# Patient Record
Sex: FEMALE
[De-identification: names, ages, dates, MRNs, and addresses within clinical notes are randomized per-mention and may not be internally consistent; named-entity substitution may affect disease eponyms.]

---

## 2018-12-11 ENCOUNTER — HOSPITAL ENCOUNTER (EMERGENCY)
Dept: HOSPITAL 97 - ER | Age: 53
Discharge: HOME | End: 2018-12-11
Payer: SELF-PAY

## 2018-12-11 DIAGNOSIS — R07.9: Primary | ICD-10-CM

## 2018-12-11 DIAGNOSIS — I10: ICD-10-CM

## 2018-12-11 LAB
ALBUMIN SERPL BCP-MCNC: 3.9 G/DL (ref 3.4–5)
ALP SERPL-CCNC: 63 U/L (ref 45–117)
ALT SERPL W P-5'-P-CCNC: 19 U/L (ref 12–78)
AST SERPL W P-5'-P-CCNC: 13 U/L (ref 15–37)
BUN BLD-MCNC: 18 MG/DL (ref 7–18)
GLUCOSE SERPLBLD-MCNC: 95 MG/DL (ref 74–106)
HCT VFR BLD CALC: 43 % (ref 36–45)
INR BLD: 1.15
LYMPHOCYTES # SPEC AUTO: 2.3 K/UL (ref 0.7–4.9)
MAGNESIUM SERPL-MCNC: 2.3 MG/DL (ref 1.8–2.4)
MCH RBC QN AUTO: 28.6 PG (ref 27–35)
MCV RBC: 85.4 FL (ref 80–100)
NT-PROBNP SERPL-MCNC: 55 PG/ML (ref ?–125)
PMV BLD: 10.3 FL (ref 7.6–11.3)
POTASSIUM SERPL-SCNC: 3.9 MMOL/L (ref 3.5–5.1)
RBC # BLD: 5.03 M/UL (ref 3.86–4.86)
TROPONIN (EMERG DEPT USE ONLY): < 0.02 NG/ML (ref 0–0.04)
UA COMPLETE W REFLEX CULTURE PNL UR: (no result)

## 2018-12-11 PROCEDURE — 83735 ASSAY OF MAGNESIUM: CPT

## 2018-12-11 PROCEDURE — 36415 COLL VENOUS BLD VENIPUNCTURE: CPT

## 2018-12-11 PROCEDURE — 71045 X-RAY EXAM CHEST 1 VIEW: CPT

## 2018-12-11 PROCEDURE — 83880 ASSAY OF NATRIURETIC PEPTIDE: CPT

## 2018-12-11 PROCEDURE — 85025 COMPLETE CBC W/AUTO DIFF WBC: CPT

## 2018-12-11 PROCEDURE — 80048 BASIC METABOLIC PNL TOTAL CA: CPT

## 2018-12-11 PROCEDURE — 85610 PROTHROMBIN TIME: CPT

## 2018-12-11 PROCEDURE — 99285 EMERGENCY DEPT VISIT HI MDM: CPT

## 2018-12-11 PROCEDURE — 84484 ASSAY OF TROPONIN QUANT: CPT

## 2018-12-11 PROCEDURE — 70450 CT HEAD/BRAIN W/O DYE: CPT

## 2018-12-11 PROCEDURE — 85379 FIBRIN DEGRADATION QUANT: CPT

## 2018-12-11 PROCEDURE — 81015 MICROSCOPIC EXAM OF URINE: CPT

## 2018-12-11 PROCEDURE — 80076 HEPATIC FUNCTION PANEL: CPT

## 2018-12-11 PROCEDURE — 93005 ELECTROCARDIOGRAM TRACING: CPT

## 2018-12-11 PROCEDURE — 81003 URINALYSIS AUTO W/O SCOPE: CPT

## 2018-12-11 NOTE — ER
Nurse's Notes                                                                                     

 [facility]                                                                

Name: Diann Maddox                                                                                 

Age: 53 yrs                                                                                       

Sex: Female                                                                                       

: 1965                                                                                   

MRN: [id number]                                                                                   

Arrival Date: 2018                                                                          

Time: 12:19                                                                                       

Account#: [id number]                                                                            

Bed 20                                                                                            

Private MD:                                                                                       

Diagnosis: Chest pain, unspecified                                                                

                                                                                                  

Presentation:                                                                                     

                                                                                             

12:19 Presenting complaint: Patient states: sitting at work when suddenly approximately 30    ss  

      minutes ago, began experiencing dizziness and slight L sided chest tightness and            

      intermittent palpitations. Transition of care: patient was not received from another        

      setting of care. Onset of symptoms was 2018. Risk Assessment: Do you want      

      to hurt yourself or someone else? Patient reports no desire to harm self or others.         

      Initial Sepsis Screen: Does the patient meet any 2 criteria? No. Patient's initial          

      sepsis screen is negative. Does the patient have a suspected source of infection? No.       

      Patient's initial sepsis screen is negative. Care prior to arrival: None.                   

12:19 Method Of Arrival: EMS: [facility]                                                      

12:19 Acuity: BILLY 3                                                                           ss  

                                                                                                  

Historical:                                                                                       

- Allergies:                                                                                      

12:20 NKDA;                                                                                   ss  

- PMHx:                                                                                           

17:52 Hypertension; Glaucoma;                                                                 gs  

                                                                                                  

- Immunization history:: Adult Immunizations up to date.                                          

- Social history:: Smoking status: Patient/guardian denies using tobacco, the patient             

  reports quitting approximately 7 years ago.                                                     

- Ebola Screening: : Patient denies exposure to infectious person Patient denies travel           

  to an Ebola-affected area in the 21 days before illness onset.                                  

                                                                                                  

                                                                                                  

Screenin:00 Fall Risk None identified.                                                              ca1 

13:40 Abuse screen: Denies threats or abuse. Nutritional screening: No deficits noted.        ca1 

      Tuberculosis screening: No symptoms or risk factors identified.                             

                                                                                                  

Assessment:                                                                                       

13:00 General: Appears in no apparent distress. comfortable, Behavior is calm, cooperative.   ca1 

      Pain: Pain: Complains of pain in anterior aspect of left upper chest Pain does not          

      radiate. Pain at worst was 8 out of 10 on a pain scale. Quality of pain is described as     

      tightness Pain began suddenly, 2 hours ago. Is intermittent.                                

13:00 Neuro: Level of Consciousness is awake, alert, obeys commands, Oriented to person,      ca1 

      place, time, situation. Neuro: Reports dizziness, since 2-3 hours ago. Cardiovascular:.     

      Cardiovascular: Reports Heart tones S1 S2 present Capillary refill < 3 seconds Rhythm       

      is regular. Respiratory: Airway is patent Trachea midline Respiratory effort is even,       

      unlabored, Respiratory pattern is regular, symmetrical, Breath sounds are clear             

      bilaterally. GI: No signs and/or symptoms were reported involving the gastrointestinal      

      system. : No signs and/or symptoms were reported regarding the genitourinary system.      

      EENT: No signs and/or symptoms were reported regarding the EENT system. Derm: Skin is       

      intact, is healthy with good turgor, Skin is pink, warm \T\ dry. Musculoskeletal: No        

      signs and/or symptoms reported regarding the musculoskeletal system.                        

14:00 Reassessment: Patient appears in no apparent distress at this time. No changes from     hb  

      previously documented assessment. Patient and/or family updated on plan of care and         

      expected duration. Pain level reassessed. Patient is alert, oriented x 3, equal             

      unlabored respirations, skin warm/dry/pink.                                                 

14:50 Reassessment: Patient appears in no apparent distress at this time. No changes from     hb  

      previously documented assessment. Patient and/or family updated on plan of care and         

      expected duration. Pain level reassessed. Patient is alert, oriented x 3, equal             

      unlabored respirations, skin warm/dry/pink.                                                 

15:10 Reassessment:. Cardiovascular: Chest pain is described as Pain is 5 out of 10 on a pain ca1 

      scale. quality is tightness. is located in left began suddenly, episodes are continuous     

      Dr. Calloway notified. Repeat EKG ordered.                                                     

16:06 Reassessment: Patient appears in no apparent distress at this time. Patient and/or      hb  

      family updated on plan of care and expected duration. Pain level reassessed. Patient is     

      alert, oriented x 3, equal unlabored respirations, skin warm/dry/pink. Pt reports mild      

      chest discomfort 3/10. Dr. Calloway aware. No new orders at this time.                         

18:11 Reassessment: No changes from previously documented assessment. Patient and/or family   mg2 

      updated on plan of care and expected duration. Pain level reassessed. Patient is alert,     

      oriented x 3, equal unlabored respirations, skin warm/dry/pink.                             

                                                                                                  

Vital Signs:                                                                                      

12:20  / 88; Pulse 88; Resp 16; Pulse Ox 98% on R/A; Weight 72.57 kg; Height 5 ft. 4    ss  

      in. (162.56 cm); Pain 0/10;                                                                 

13:40  / 80; Pulse 75; Resp 19; Pulse Ox 98% ; Pain 0/10;                               ca1 

14:50  / 83; Pulse 77; Resp 16; Pulse Ox 100% on R/A; Pain 0/10;                        hb  

16:00  / 89; Pulse 94; Resp 18; Pulse Ox 98% on R/A;                                    hb  

18:11  / 84; Pulse 90; Resp 18; Pulse Ox 100% on R/A; Pain 0/10;                        mg2 

12:20 Body Mass Index 27.46 (72.57 kg, 162.56 cm)                                               

                                                                                                  

ED Course:                                                                                        

12:19 Patient arrived in ED.                                                                  ss  

12:19 David Calloway MD is Attending Physician.                                              gs  

12:20 Triage completed.                                                                       ss  

12:20 Arm band placed on right wrist.                                                         ss  

12:38 EKG done, by EKG tech. reviewed by David Calloway MD.                                    at1 

12:49 Patient moved to CT via wheelchair.                                                     ca1 

12:50 CT Head Brain wo Cont In Process Unspecified.                                           EDMS

12:50 Patient has correct armband on for positive identification. Placed in gown. Bed in low  ca1 

      position. Call light in reach. Side rails up X 1.                                           

12:54 X-ray completed. Portable x-ray completed in exam room. Patient tolerated procedure     mh1 

      well.                                                                                       

12:56 XRAY Chest (1 view) In Process Unspecified.                                             EDMS

13:00 Cardiac monitor on. Pulse ox on. NIBP on.                                               ca1 

13:00 Inserted saline lock: 20 gauge in left antecubital area, using aseptic technique. Blood ca1 

      collected. Patient maintains SpO2 saturation greater than 95% on room air.                  

13:47 Nellie Damian, RN is Primary Nurse.                                                   hb  

15:15 REPEAT EKG WAS DONE.                                                                    sm3 

16:50 Repeat lab(s) drawn. by me, sent to lab.                                                dh3 

18:11 No provider procedures requiring assistance completed. IV discontinued, intact,         mg2 

      bleeding controlled, No redness/swelling at site. Pressure dressing applied.                

                                                                                                  

Administered Medications:                                                                         

No medications were administered                                                                  

                                                                                                  

                                                                                                  

Outcome:                                                                                          

17:54 Discharge ordered by MD.                                                                gs  

18:16 Discharged to home ambulatory.                                                          hb  

18:16 Condition: stable                                                                           

18:16 Discharge instructions given to patient, Instructed on discharge instructions, follow       

      up and referral plans. medication usage, Demonstrated understanding of instructions,        

      follow-up care, medications.                                                                

18:16 Patient left the ED.                                                                    hb  

                                                                                                  

Signatures:                                                                                       

Dispatcher [employer]                           EDMS                                                 

Bing Nieves                               1                                                  

Gena Barahona, RN                      RN                                                      

Kim Lima, EKG Tech              EKG Tat1                                                  

Kitty Cloud, RN                     RN   rb1                                                  

Nellie Damian RN                     RN                                                      

Torie Santacruz                              3                                                  

David Calloway MD MD                                                      

Ton Jaffe RN                    RN   mg2                                                  

Manasa Shabazz                              3                                                  

Valeria Alejandro RN                        RN   ca1                                                  

                                                                                                  

Corrections: (The following items were deleted from the chart)                                    

 13:10 Inserted saline lock: 20 gauge in left antecubital area, using aseptic technique. rb1 

      Blood collected. rb1                                                                        

 13:10 Initial lab(s) drawn, by me, sent to lab. rb1                                     rb1 

13: 12:50 Cardiac monitor on. Pulse ox on. NIBP on. ca1                                     ca1 

:40 13:30 General: Appears in no apparent distress. comfortable, Behavior is calm,          ca1 

      cooperative, ca1                                                                            

13:40 13:30 Pain: ca1                                                                         ca1 

13:42 13:40 Fall Risk None identified. ca1                                                    ca1 

                                                                                                  

**************************************************************************************************

## 2018-12-11 NOTE — EDPHYS
Physician Documentation                                                                           

 [facility]                                                                

Name: Diann Maddox                                                                                 

Age: 53 yrs                                                                                       

Sex: Female                                                                                       

: 1965                                                                                   

MRN: [id number]                                                                                   

Arrival Date: 2018                                                                          

Time: 12:19                                                                                       

Account#: [id number]                                                                            

Bed 20                                                                                            

Private MD:                                                                                       

ED Physician David Calloway                                                                       

HPI:                                                                                              

                                                                                             

18:00 This 53 yrs old  Female presents to ER via EMS with complaints of Chest Pain.   gs  

18:00 The patient or guardian reports chest pain that is located primarily in the anterior    gs  

      chest wall. Onset: today. The pain does not radiate. Associated signs and symptoms:         

      Pertinent positives: dizziness. The chest pain is described as dull. Duration: The          

      patient or guardian reports multiple episodes, that are intermittent, that wax and          

      wane, with no pattern, the episodes last approximately 5 second(s). Modifying factors:      

      The symptoms are alleviated by nothing. the symptoms are aggravated by nothing.             

      Severity of pain: At its worst the pain was moderate in the emergency department the        

      pain has resolved.                                                                          

                                                                                                  

Historical:                                                                                       

- Allergies:                                                                                      

12:20 NKDA;                                                                                   ss  

- PMHx:                                                                                           

17:52 Hypertension; Glaucoma;                                                                 gs  

                                                                                                  

- Immunization history:: Adult Immunizations up to date.                                          

- Social history:: Smoking status: Patient/guardian denies using tobacco, the patient             

  reports quitting approximately 7 years ago.                                                     

- Ebola Screening: : Patient denies exposure to infectious person Patient denies travel           

  to an Ebola-affected area in the 21 days before illness onset.                                  

                                                                                                  

                                                                                                  

ROS:                                                                                              

18:00 All other systems are negative.                                                         gs  

                                                                                                  

Exam:                                                                                             

18:00 Head/Face:  Normocephalic, atraumatic. Eyes:  Pupils equal round and reactive to light, gs  

      extra-ocular motions intact.  Lids and lashes normal.  Conjunctiva and sclera are           

      non-icteric and not injected.  Cornea within normal limits.  Periorbital areas with no      

      swelling, redness, or edema. ENT:  Nares patent. No nasal discharge, no septal              

      abnormalities noted.  Tympanic membranes are normal and external auditory canals are        

      clear.  Oropharynx with no redness, swelling, or masses, exudates, or evidence of           

      obstruction, uvula midline.  Mucous membranes moist. Neck:  Trachea midline, no             

      thyromegaly or masses palpated, and no cervical lymphadenopathy.  Supple, full range of     

      motion without nuchal rigidity, or vertebral point tenderness.  No Meningismus.             

      Chest/axilla:  Normal chest wall appearance and motion.  Nontender with no deformity.       

      No lesions are appreciated.                                                                 

18:00 Constitutional: The patient appears alert, awake.                                           

18:00 Cardiovascular: Rate: normal, Rhythm: regular, Pulses: no pulse deficits are                

      appreciated, Heart sounds: normal.                                                          

18:00 ECG was reviewed by the Attending Physician.                                                

18:03 Respiratory:  Lungs have equal breath sounds bilaterally, clear to auscultation and     gs  

      percussion.  No rales, rhonchi or wheezes noted.  No increased work of breathing, no        

      retractions or nasal flaring. Abdomen/GI:  Soft, non-tender, with normal bowel sounds.      

      No distension or tympany.  No guarding or rebound.  No evidence of tenderness               

      throughout. Back:  No spinal tenderness.  No costovertebral tenderness.  Full range of      

      motion. Skin:  Warm, dry with normal turgor.  Normal color with no rashes, no lesions,      

      and no evidence of cellulitis. MS/ Extremity:  Pulses equal, no cyanosis.                   

      Neurovascular intact.  Full, normal range of motion. Neuro:  Awake and alert, GCS 15,       

      oriented to person, place, time, and situation.  Cranial nerves II-XII grossly intact.      

      Motor strength 5/5 in all extremities.  Sensory grossly intact.  Cerebellar exam            

      normal.  Normal gait.                                                                       

18:03 ECG was reviewed by the Attending Physician.                                                

                                                                                                  

Vital Signs:                                                                                      

12:20  / 88; Pulse 88; Resp 16; Pulse Ox 98% on R/A; Weight 72.57 kg; Height 5 ft. 4    ss  

      in. (162.56 cm); Pain 0/10;                                                                 

13:40  / 80; Pulse 75; Resp 19; Pulse Ox 98% ; Pain 0/10;                               ca1 

14:50  / 83; Pulse 77; Resp 16; Pulse Ox 100% on R/A; Pain 0/10;                        hb  

16:00  / 89; Pulse 94; Resp 18; Pulse Ox 98% on R/A;                                    hb  

18:11  / 84; Pulse 90; Resp 18; Pulse Ox 100% on R/A; Pain 0/10;                        mg2 

12:20 Body Mass Index 27.46 (72.57 kg, 162.56 cm)                                             ss  

                                                                                                  

MDM:                                                                                              

12:34 Patient medically screened.                                                             gs  

18:03 Differential diagnosis: coronary artery disease chest wall pain, pneumonia, pulmonary   gs  

      embolus. Data reviewed: vital signs, nurses notes. Counseling: I had a detailed             

      discussion with the patient and/or guardian regarding: the historical points, exam          

      findings, and any diagnostic results supporting the discharge/admit diagnosis, lab          

      results, radiology results, the need for outpatient follow up.                              

                                                                                                  

                                                                                             

12:34 Order name: Basic Metabolic Panel                                                         

                                                                                             

12:34 Order name: CBC with Diff                                                                 

                                                                                             

12:34 Order name: LFT's; Complete Time: 15:11                                                   

                                                                                             

12:34 Order name: Magnesium; Complete Time: 15:11                                               

                                                                                             

12:34 Order name: NT PRO-BNP; Complete Time: 15:11                                              

                                                                                             

12:34 Order name: PT-INR; Complete Time: 15:11                                                  

                                                                                             

12:34 Order name: Troponin (emerg Dept Use Only); Complete Time: 15:11                          

                                                                                             

12:34 Order name: XRAY Chest (1 view); Complete Time: 13:25                                     

                                                                                             

12:34 Order name: Urine Microscopic Only; Complete Time: 15:11                                  

                                                                                             

12:34 Order name: D-Dimer; Complete Time: 15:11                                                 

                                                                                             

12:35 Order name: Basic Metabolic Panel; Complete Time: 15:11                                 EDMS

                                                                                             

17:09 Interpretation: .                                                                   

                                                                                             

12:35 Order name: CBC with Automated Diff; Complete Time: 15:11                               EDMS

                                                                                             

13:25 Order name: Urine Dipstick--Ancillary (enter results); Complete Time: 15:11               

                                                                                             

15:44 Order name: Troponin (emerg Dept Use Only); Complete Time: 17:45                          

                                                                                             

12:34 Order name: EKG; Complete Time: 12:35                                                     

                                                                                             

12:34 Order name: Cardiac monitoring; Complete Time: 13:47                                      

                                                                                             

12:34 Order name: EKG - Nurse/Tech; Complete Time: 13:47                                        

                                                                                             

12:34 Order name: IV Saline Lock; Complete Time: 13:47                                          

                                                                                             

12:34 Order name: Labs collected and sent; Complete Time: 13:47                                 

                                                                                             

12:34 Order name: O2 Per Protocol; Complete Time: 13:47                                         

                                                                                             

12:34 Order name: O2 Sat Monitoring; Complete Time: 13:47                                       

                                                                                             

12:34 Order name: Urine Dipstick-Ancillary (obtain specimen); Complete Time: 13:47            gs  

                                                                                             

12:34 Order name: CT Head Brain wo Cont; Complete Time: 13:25                                 gs  

                                                                                             

15:20 Order name: EKG; Complete Time: 15:21                                                   ca1 

                                                                                             

15:20 Order name: EKG - Nurse/Tech; Complete Time: 15:32                                      ca1 

                                                                                                  

EC:00 Rate is 84 beats/min. Rhythm is regular. RI interval is normal. QRS interval is normal. gs  

      T waves are Normal. No ST changes noted. Clinical impression: Normal ECG. Interpreted       

      by me.                                                                                      

18:03 Rate is 66 beats/min. Rhythm is regular. RI interval is normal. QRS interval is normal. gs  

      QT interval is normal. T waves are Normal. No ST changes noted. Clinical impression:        

      Normal ECG. Interpreted by me.                                                              

                                                                                                  

Administered Medications:                                                                         

No medications were administered                                                                  

                                                                                                  

                                                                                                  

Disposition:                                                                                      

18 17:54 Discharged to Home. Impression: Chest pain, unspecified.                           

- Condition is Stable.                                                                            

- Discharge Instructions: Nonspecific Chest Pain.                                                 

                                                                                                  

- Medication Reconciliation Form, Thank You Letter, Antibiotic Education, Prescription            

  Opioid Use, Work release form form.                                                             

- Follow up: Emergency Department; When: 2 - 3 days; Reason: Re-evaluation by your                

  physician.                                                                                      

                                                                                                  

                                                                                                  

                                                                                                  

Signatures:                                                                                       

Dispatcher [employer]                           EDMS                                                 

Gena Barahona RN RN                                                      

Nellie Damian RN                     RN                                                      

David Calloway MD MD                                                      

Valeria Alejandro RN                        RN   ca1                                                  

                                                                                                  

Corrections: (The following items were deleted from the chart)                                    

18:16 17:54 2018 17:54 Discharged to Home. Impression: Chest pain, unspecified.         hb  

      Condition is Stable. Forms are Medication Reconciliation Form, Thank You Letter,            

      Antibiotic Education, Prescription Opioid Use. Follow up: Emergency Department; When: 2     

      - 3 days; Reason: Re-evaluation by your physician.                                        

                                                                                                  

**************************************************************************************************

## 2018-12-11 NOTE — EKG
Test Date:    2018-12-11               Test Time:    12:25:17

Technician:   NAKUL                                     

                                                     

MEASUREMENT RESULTS:                                       

Intervals:                                           

Rate:         84                                     

NM:           160                                    

QRSD:         82                                     

QT:           358                                    

QTc:          423                                    

Axis:                                                

P:            47                                     

NM:           160                                    

QRS:          55                                     

T:            62                                     

                                                     

INTERPRETIVE STATEMENTS:                                       

                                                     

Normal sinus rhythm

Normal ECG

No previous ECG available for comparison



Electronically Signed On 12-11-18 17:46:03 CST by All Luna

## 2018-12-11 NOTE — EKG
Test Date:    2018-12-11               Test Time:    15:10:14

Technician:   VASQUEZ                                     

                                                     

MEASUREMENT RESULTS:                                       

Intervals:                                           

Rate:         66                                     

ND:           156                                    

QRSD:         80                                     

QT:           412                                    

QTc:          431                                    

Axis:                                                

P:            51                                     

ND:           156                                    

QRS:          57                                     

T:            63                                     

                                                     

INTERPRETIVE STATEMENTS:                                       

                                                     

Normal sinus rhythm

Normal ECG

Compared to ECG 12/11/2018 12:25:17

No significant changes



Electronically Signed On 12-11-18 17:44:18 CST by All Luna

## 2018-12-11 NOTE — RAD REPORT
EXAM DESCRIPTION:  Femi Single View12/11/2018 12:55 pm

 

CLINICAL HISTORY:  Chest pain

 

COMPARISON:  2016

 

FINDINGS:   The lungs appear clear of acute infiltrate. The heart is normal size

 

IMPRESSION:   No acute abnormalities displayed

## 2018-12-11 NOTE — RAD REPORT
EXAM DESCRIPTION:  CT - Head Brain Wo Cont - 12/11/2018 12:50 pm

 

CLINICAL HISTORY:  Dizziness, syncope

 

COMPARISON:  November 2016

 

TECHNIQUE:  Axial 5 mm thick images of the head were obtained without IV contrast.

 

All CT scans are performed using dose optimization technique as appropriate and may include automated
 exposure control or mA/KV adjustment according to patient size.

 

FINDINGS:  No intracranial hemorrhage, mass, edema or shift of mid-line structures. No acute infarcti
on changes seen. No abnormal extra-axial fluid collections. Ventricles are normal.

 

Mastoid air cells and middle ears are clear. Paranasal sinuses are clear.

 

No acute bony findings.

 

 

IMPRESSION:  Negative non-contrast CT head examination.  No significant change from the 2016 comparis
on.

## 2025-02-27 ENCOUNTER — HOSPITAL ENCOUNTER (EMERGENCY)
Dept: HOSPITAL 97 - ER | Age: 60
Discharge: HOME | End: 2025-02-27
Payer: COMMERCIAL

## 2025-02-27 VITALS — DIASTOLIC BLOOD PRESSURE: 57 MMHG | OXYGEN SATURATION: 99 % | SYSTOLIC BLOOD PRESSURE: 140 MMHG

## 2025-02-27 VITALS — TEMPERATURE: 97.6 F

## 2025-02-27 DIAGNOSIS — I10: ICD-10-CM

## 2025-02-27 DIAGNOSIS — Z88.8: ICD-10-CM

## 2025-02-27 DIAGNOSIS — R07.9: Primary | ICD-10-CM

## 2025-02-27 DIAGNOSIS — K21.00: ICD-10-CM

## 2025-02-27 LAB
ALBUMIN SERPL BCP-MCNC: 3.4 G/DL (ref 3.4–5)
ALBUMIN/GLOB SERPL: 0.9 {RATIO} (ref 1.1–1.8)
ALP SERPL-CCNC: 56 U/L (ref 45–117)
ALT SERPL W P-5'-P-CCNC: 19 U/L (ref 13–56)
ANION GAP SERPL CALC-SCNC: 11.4 MEQ/L (ref 5–15)
AST SERPL W P-5'-P-CCNC: 13 U/L (ref 15–37)
BILIRUB INDIRECT SERPL-MCNC: 0.2 MG/DL (ref 0.2–0.8)
BUN BLD-MCNC: 17 MG/DL (ref 7–18)
GLOBULIN SER CALC-MCNC: 3.7 G/DL (ref 2.3–3.5)
GLUCOSE SERPLBLD-MCNC: 133 MG/DL (ref 74–106)
HCT VFR BLD CALC: 37.2 % (ref 36–45)
HGB BLD-MCNC: 12.7 G/DL (ref 12–15)
INR BLD: 1.13
LYMPHOCYTES # SPEC AUTO: 2.4 K/UL (ref 0.7–4.9)
MAGNESIUM SERPL-MCNC: 2 MG/DL (ref 1.6–2.4)
MCH RBC QN AUTO: 28.3 PG (ref 27–35)
MCHC RBC AUTO-ENTMCNC: 34 G/DL (ref 32–36)
MCV RBC: 83.1 FL (ref 80–100)
NRBC # BLD: 0 10*3/UL (ref 0–0)
NRBC BLD AUTO-RTO: 0.1 % (ref 0–0)
NT-PROBNP SERPL-MCNC: 69 PG/ML (ref ?–125)
PMV BLD: 9.6 FL (ref 7.6–11.3)
POTASSIUM SERPL-SCNC: 3.4 MEQ/L (ref 3.5–5.1)
PROTHROMBIN TIME: 12.8 SECONDS (ref 10–13)
RBC # BLD: 4.48 M/UL (ref 3.86–4.86)
TROPONIN I SERPL HS-MCNC: < 3 PG/ML (ref ?–58.9)
WBC # BLD AUTO: 5.9 THOU/UL (ref 4.3–10.9)

## 2025-02-27 PROCEDURE — 83880 ASSAY OF NATRIURETIC PEPTIDE: CPT

## 2025-02-27 PROCEDURE — 71275 CT ANGIOGRAPHY CHEST: CPT

## 2025-02-27 PROCEDURE — 93005 ELECTROCARDIOGRAM TRACING: CPT

## 2025-02-27 PROCEDURE — 36415 COLL VENOUS BLD VENIPUNCTURE: CPT

## 2025-02-27 PROCEDURE — 80048 BASIC METABOLIC PNL TOTAL CA: CPT

## 2025-02-27 PROCEDURE — 71045 X-RAY EXAM CHEST 1 VIEW: CPT

## 2025-02-27 PROCEDURE — 99284 EMERGENCY DEPT VISIT MOD MDM: CPT

## 2025-02-27 PROCEDURE — 96374 THER/PROPH/DIAG INJ IV PUSH: CPT

## 2025-02-27 PROCEDURE — 85610 PROTHROMBIN TIME: CPT

## 2025-02-27 PROCEDURE — 84484 ASSAY OF TROPONIN QUANT: CPT

## 2025-02-27 PROCEDURE — 85025 COMPLETE CBC W/AUTO DIFF WBC: CPT

## 2025-02-27 PROCEDURE — 83735 ASSAY OF MAGNESIUM: CPT

## 2025-02-27 PROCEDURE — 80076 HEPATIC FUNCTION PANEL: CPT

## 2025-02-27 NOTE — EDPHYS
Physician Documentation                                                                           

 [facility]                                                                 

Name: Diann Maddox                                                                                 

Age: 59 yrs                                                                                       

Sex: Female                                                                                       

: 1965                                                                                   

MRN: [id number]                                                                                   

Arrival Date: 2025                                                                          

Time: 13:49                                                                                       

Account#: [id number]                                                                            

Bed 6                                                                                             

Private MD:                                                                                       

ED Physician Rodri Suazo                                                                         

HPI:                                                                                              

                                                                                             

14:21 This 59 yrs old  Female presents to ER via Ambulatory with complaints of Chest  sb4 

      Pain.                                                                                       

14:21 Patient reports 3 episodes of chest pain this past week. She did see cardiology 2 days  sb4 

      ago where she had a normal EKG and was scheduled to get a stress test and an                

      echocardiogram done. Reports the pain is in her left chest, below her breast, sometimes     

      feels sharp, and sometimes makes her feel like she cannot take a deep breath. Says that     

      she does feel some tingling in her left arm, but otherwise no radiation of pain. Denies     

      any dizziness, nausea, diaphoresis.                                                         

                                                                                                  

Historical:                                                                                       

- Allergies:                                                                                      

13:56 unknown steroid;                                                                        ap3 

- PMHx:                                                                                           

13:56 Glaucoma; Hypertension;                                                                 ap3 

                                                                                                  

- Immunization history:: Client reports receiving the 2nd dose of the Covid vaccine.              

- Infectious Disease History:: Denies.                                                            

- Social history:: Smoking status: Patient denies any tobacco usage or history of.                

                                                                                                  

                                                                                                  

ROS:                                                                                              

14:21 Constitutional: Negative for fever, chills, and weight loss,                            sb4 

14:21 Cardiovascular: Positive for chest pain,                                                    

14:21 All other systems are negative,                                                             

                                                                                                  

Exam:                                                                                             

16:58 Constitutional:  This is a well developed, well nourished patient who is awake, alert,  sb4 

      and in no acute distress. Head/Face:  Normocephalic, atraumatic. Eyes:  Extra-ocular        

      motions intact.  Periorbital areas with no swelling, redness, or edema. ENT:  Mucous        

      membranes moist. Cardiovascular:  Regular rate and rhythm with a normal S1 and S2.          

      Respiratory:  No increased work of breathing, no retractions or nasal flaring.              

      Abdomen/GI:  Soft, non-tender, no distension. Skin:  Warm, dry with normal turgor.          

      Normal color with no rashes, no lesions, and no evidence of cellulitis.                     

                                                                                                  

Vital Signs:                                                                                      

13:55  / 74; Pulse 63; Resp 18; Temp 97.6; Pulse Ox 100% ; Weight 74.84 kg; Height 5    ap3 

      ft. 4 in. ; Pain 8/10;                                                                      

15:36  / 57; Pulse 76; Resp 16; Pulse Ox 99% ;                                          bp  

13:55 Body Mass Index 28.32 (74.84 kg, 162.56 cm)                                             ap3 

13:55 Pain Scale: Adult                                                                       ap3 

                                                                                                  

MDM:                                                                                              

13:52 Medical Screening Exam initiated                                                        sb4 

15:06 Scoring Tools HEART Score: History: ECG: Age: Risk Factors: 1 or 2 risk factors (1),    sb4 

      Troponin: Total Score = 3.                                                                  

16:58 Data reviewed: vital signs, nurses notes, lab test result(s), EKG, radiologic studies,  sb4 

      and as a result, I will discharge patient. Consideration of Admission/Observation           

      Escalation of care including admission/observation considered. Care significantly           

      affected by the following chronic conditions: Hypertension. Counseling: I had a             

      detailed discussion with the patient and/or guardian regarding the historical points,       

      exam findings, and any diagnostic results supporting the discharge/admit diagnosis, the     

      presence of at least one elevated blood pressure reading (>120/80) during this              

      emergency department visit, lab results, radiology results, the need for outpatient         

      follow up, a cardiologist, a gastroenterologist, to return to the emergency department      

      if symptoms worsen or persist or if there are any questions or concerns that arise at       

      home.                                                                                       

                                                                                                  

                                                                                             

14:02 Order name: Basic Metabolic Panel; Complete Time: 15:06                                 ap3 

                                                                                             

14:02 Order name: CBC with Diff; Complete Time: 14:53                                         3 

                                                                                             

14:02 Order name: LFT's; Complete Time: 15:06                                                 3 

                                                                                             

14:02 Order name: Magnesium; Complete Time: 15:06                                             ap3 

                                                                                             

14:02 Order name: NT PRO-BNP; Complete Time: 15:06                                            ap3 

                                                                                             

14:02 Order name: PT-INR; Complete Time: 14:53                                                ap3 

                                                                                             

14:02 Order name: Troponin HS; Complete Time: 15:06                                           ap3 

                                                                                             

15:13 Order name: Troponin High Sensitivity: at 1640; Complete Time: 16:49                    sb4 

                                                                                             

14:02 Order name: XRAY Chest (1 view); Complete Time: 14:40                                   ap3 

                                                                                             

15:24 Order name: Chest For PE Angio CT; Complete Time: 16:00                                 sb4 

                                                                                             

14:02 Order name: Cardiac monitoring; Complete Time: 14:51                                    ap3 

                                                                                             

14:02 Order name: EKG - Nurse/Tech; Complete Time: 14:51                                      ap3 

                                                                                             

14:02 Order name: IV Saline Lock; Complete Time: 14:51                                        ap3 

                                                                                             

14:02 Order name: Labs collected and sent; Complete Time: 14:51                               ap3 

                                                                                             

14:02 Order name: O2 Per Protocol; Complete Time: 14:51                                       ap3 

                                                                                             

14:02 Order name: O2 Sat Monitoring; Complete Time: 14:51                                     ap3 

                                                                                                  

EC:15 Rate is 67 beats/min. Rhythm is regular, Normal Sinus Rhythm. TX interval is normal at  sb4 

      152 msec. QRS interval is normal at 78 msec. QT interval is normal at 410 msec. No Q        

      waves. T waves are Normal. Clinical impression: No evidence of ischemia. Interpreted by     

      me. Reviewed by me.                                                                         

                                                                                                  

Administered Medications:                                                                         

15:30 Drug: Aspirin  mg PO once Route: PO;                                              bp  

15:30 Drug: Nitroglycerin Sublingual 0.4 mg Sublingual once Route: Sublingual;                bp  

16:18 Follow up: Response: No adverse reaction                                                ld1 

16:18 Drug: Famotidine IVP 20 mg IVP once; dilute with 10 mL 0.9% NaCl; give over 2 minutes   ld1 

      Route: IVP; Site: right wrist;                                                              

                                                                                                  

                                                                                                  

Disposition:                                                                                      

18:12 Co-signature as Attending Physician, Rodri Suazo MD I reviewed the patient's care       rn  

      provided by the Advanced Practice Provider and agree with the diagnosis and treatment       

      plan.                                                                                       

                                                                                                  

Disposition Summary:                                                                              

25 16:57                                                                                    

Discharge Ordered                                                                                 

 Notes:       Location: Home                                                                        
  sb4

      Problem: new                                                                            sb4 

      Symptoms: have improved                                                                 sb4 

      Condition: Stable                                                                       sb4 

      Diagnosis                                                                                   

        - Chest pain, unspecified                                                             sb4 

        - Gastro-esophageal reflux disease with esophagitis                                   sb4 

      Followup:                                                                               sb4 

        - With: Kit Naranjo MD                                                                 

        - When: 1 week                                                                             

        - Reason: Further diagnostic work-up, Recheck today's complaints, Re-evaluation by         

      your physician                                                                              

      Followup:                                                                               sb4 

        - With: Elliot Rey MD                                                                

        - When: As needed                                                                          

        - Reason: Further diagnostic work-up, Recheck today's complaints, Re-evaluation by         

      your physician                                                                              

      Discharge Instructions:                                                                     

        - Discharge Summary Sheet                                                             sb4 

        - Food Choices for Gastroesophageal Reflux Disease, Adult                             sb4 

        - Esophagitis                                                                         sb4 

        - Hiatal Hernia                                                                       sb4 

        - Nonspecific Chest Pain, Adult, Easy-to-Read                                         sb4 

      Forms:                                                                                      

        - Patient Portal Instructions                                                         sb4 

        - Leadership Thank You Letter                                                         sb4 

      Prescriptions:                                                                              

        - Protonix 40 mg Oral Tablet                                                               

            - take 1 tablet ORAL route once daily; 30 tablet; Refills: 0, Product Selection   sb4 

      Permitted                                                                                   

Signatures:                                                                                       

Dispatcher [employer]                           EDMS                                                 

Rodri Suazo MD MD rn Peltier, Brian, RN                      RN   bp                                                   

Kim Gonzales RN                    RN   ap3                                                  

Alyssia Knight RN                        RN   ld1                                                  

Michelle Jiménez PA-C                     PAKAYY sb4                                                  

                                                                                                  

Corrections: (The following items were deleted from the chart)                                    

14:02 14:02 BASIC METABOLIC PANEL+C.LAB.BRZ ordered. EDMS                                     EDMS

14:02 14:02 CBC+H.LAB.BRZ ordered. EDMS                                                       EDMS

14:02 14:02 HEPATIC FUNCTION+C.LAB.BRZ ordered. EDMS                                          EDMS

14:02 14:02 MAGNESIUM+C.LAB.BRZ ordered. EDMS                                                 EDMS

14:02 14:02 PROBNP+C.LAB.BRZ ordered. EDMS                                                    EDMS

14:02 14:02 PROTIME (+INR)+COAG.LAB.BRZ ordered. EDMS                                         EDMS

14:02 14:02 Troponin High Sensitivity+C.LAB.BRZ ordered. EDMS                                 EDMS

14:02 14:02 Chest Single View+RAD.RAD.BRZ ordered. EDMS                                       EDMS

15:13 15:13 Troponin High Sensitivity+C.LAB.BRZ ordered. EDMS                                 EDMS

                                                                                                  

**************************************************************************************************

## 2025-02-27 NOTE — RAD REPORT
EXAMINATION: CTA CHEST PE



CLINICAL INDICATION: Female, 59 years old. CHEST PAIN



TECHNIQUE: This examination was performed according to an angiographic protocol with 3D post-processi
ng. This involves 3D reconstructions, MIPs, volume rendered images and/or shaded surface rendering.

One or more of the following dose reduction techniques were used: Automated exposure control, adjustm
ent of the mA and/or kV according to patient size, and/or iterative reconstruction. Unless

otherwise specified, incidental findings do not require dedicated imaging follow-up. [id number]. 



COMPARISON: No priors.



FINDINGS:

LOWER NECK: Visualized thyroid gland and soft tissues are normal.

LUNGS AND AIRWAYS: Airways are clear. No evidence of airspace or interstitial process.No suspicious a
nd/or stable pulmonary nodules.

PLEURA: No pleural effusion. No pneumothorax. Hemidiaphragms are normally positioned.

MEDIASTINUM AND LYMPH NODES: No mediastinal mass or fluid collection. Normal size mediastinal, hilar,
 and axillary lymph nodes. Mild distal esophageal thickening. Small hiatal hernia.

THORACIC AORTA: No thoracic aortic aneurysm. Atherosclerotic changes are present.

PULMONARY ARTERIES: Caliber is within normal limits. No pulmonary emboli identified to the level of t
he segmental pulmonary arteries. The subsegmental pulmonary arteries cannot be adequately assessed

due to suboptimal contrast opacification.

HEART: Normal heart size. No coronary calcifications.No significant pericardial effusion.

OSSEOUS STRUCTURES AND CHEST WALL: No fracture or suspicious osseous lesions.

UPPER ABDOMEN: No acute abnormalities.Several low-density liver lesions which are incompletely charac
terized though the overall imaging features are benign. Partially imaged left adrenal nodule

measuring at least 2 cm. This has Hounsfield units of 13. Adrenal Non-Incidental Indeterminate, CT W/
C, No prior, Cancer not RCC/HCC, > 1 - < 4 cm, < 130 HU: Possible adenoma or metastasis. Recommend

adrenal washout CT, chemical shift MRI or PET/CT (Consider PET/CT when morphological characteristics 
suggest metastasis). Reference:  JACR 2017 Aug;14(8):1038-44, JCAT 2016 Mar-Apr;40(2):194-200



IMPRESSION: 

1. No evidence of pulmonary emboli to the subsegmental level. Lungs are clear. 



2. Thickened distal esophagus with small hiatal hernia which may reflect mild esophagitis.



3. Partially imaged, indeterminate left adrenal nodule likely an adenoma. Nonemergent MRI is recommen
ded. The liver lesions which are probably benign can also be better characterized in addition to

the adrenal nodule.



Reported By: Joesph Lanier 

Electronically Signed:  2/27/2025 3:57 PM

## 2025-02-27 NOTE — RAD REPORT
EXAM: Chest Single View



HISTORY: 59 years Female CHEST PAIN



COMPARISON: 12/11/2018



FINDINGS:

LUNGS/PLEURA: The lungs are clear. No pleural effusions or pneumothorax. No pulmonary edema. 

CARDIAC/MEDIASTINUM: The cardiac silhouette is within normal limits.

UPPER ABDOMEN: No significant abnormality.

BONES: No acute abnormality.

LINES/TUBES/OTHER: N/A



IMPRESSION:

No evidence of acute cardiopulmonary disease. 



Reported By: Joesph Lanier 

Electronically Signed:  2/27/2025 2:36 PM

## 2025-02-27 NOTE — ER
Nurse's Notes                                                                                     

 [facility]                                                                 

Name: Diann Maddox                                                                                 

Age: 59 yrs                                                                                       

Sex: Female                                                                                       

: 1965                                                                                   

MRN: [id number]                                                                                   

Arrival Date: 2025                                                                          

Time: 13:49                                                                                       

Account#: [id number]                                                                            

Bed 6                                                                                             

Private MD:                                                                                       

Diagnosis: Chest pain, unspecified;Gastro-esophageal reflux disease with esophagitis              

                                                                                                  

Presentation:                                                                                     

                                                                                             

13:55 Chief complaint: Patient states: she is having pain under her left breast that started  ap3 

      this morning. patient currently rates the pain as an 8/10 on the pain scale. patient        

      also reports shortness of breath. Coronavirus screen: At this time, the client does not     

      indicate any symptoms associated with coronavirus-19. Ebola Screen: No symptoms or          

      risks identified at this time. Initial Sepsis Screen: Does the patient meet any 2           

      criteria? No. Patient's initial sepsis screen is negative. Does the patient have a          

      suspected source of infection? No. Patient's initial sepsis screen is negative. Risk        

      Assessment: Do you want to hurt yourself or someone else? Patient reports no desire to      

      harm self or others. Onset of symptoms was 2025.                               

13:55 Method Of Arrival: Ambulatory                                                           ap3 

13:55 Acuity: BILLY 2                                                                           ap3 

                                                                                                  

Triage Assessment:                                                                                

13:56 General: Appears in no apparent distress. Behavior is calm, cooperative, appropriate    ap3 

      for age. Pain: Complains of pain in left breast Pain currently is 8 out of 10 on a pain     

      scale. Pain began. Cardiovascular: Patient's skin is warm and dry. Respiratory: Reports     

      shortness of breath Airway is patent Respiratory effort is even, unlabored, Respiratory     

      pattern is regular, symmetrical.                                                            

                                                                                                  

Historical:                                                                                       

- Allergies:                                                                                      

13:56 unknown steroid;                                                                        ap3 

- PMHx:                                                                                           

13:56 Glaucoma; Hypertension;                                                                 ap3 

                                                                                                  

- Immunization history:: Client reports receiving the 2nd dose of the Covid vaccine.              

- Infectious Disease History:: Denies.                                                            

- Social history:: Smoking status: Patient denies any tobacco usage or history of.                

                                                                                                  

                                                                                                  

Screenin:57 [facility] ED Fall Risk Assessment (Adult) History of falling in the last 3 months,       ap3 

      including since admission No falls in past 3 months (0 pts) Confusion or Disorientation     

      No (0 pts) Intoxicated or Sedated No (0 pts) Impaired Gait No (0 pts) Mobility Assist       

      Device Used No (0 pt) Altered Elimination No (0 pt) Score/Fall Risk Level 0 - 2 = Low       

      Risk Oriented to surroundings, Maintained a safe environment, Educated pt \T\ family on     

      fall prevention, incl call for assistance when getting out of bed, Assessed \T\             

      reinforced patient's understanding of fall precautions, Hourly rounding (assess needs \T\   

      fall precautionary measures) done, Used ambulatory aids as needed (educated on \T\          

      assisted with). Abuse screen: Denies threats or abuse. Nutritional screening: No            

      deficits noted. Tuberculosis screening: No symptoms or risk factors identified.             

                                                                                                  

Assessment:                                                                                       

10:00 General: Appears in no apparent distress. uncomfortable, Behavior is calm, cooperative, bp  

      appropriate for age.                                                                        

                                                                                                  

Vital Signs:                                                                                      

13:55  / 74; Pulse 63; Resp 18; Temp 97.6; Pulse Ox 100% ; Weight 74.84 kg; Height 5    ap3 

      ft. 4 in. ; Pain 8/10;                                                                      

15:36  / 57; Pulse 76; Resp 16; Pulse Ox 99% ;                                          bp  

13:55 Body Mass Index 28.32 (74.84 kg, 162.56 cm)                                             ap3 

13:55 Pain Scale: Adult                                                                       ap3 

                                                                                                  

ED Course:                                                                                        

10:00 Patient has correct armband on for positive identification. Provided Education on: NA.  bp  

      Client placed on continuous cardiac and pulse oximetry monitoring. NIBP monitoring          

      applied.                                                                                    

13:51 Patient arrived in ED.                                                                  mr  

13:51 Michelle Jiménez PA-C is PHCP.                                                            sb4 

13:51 Rodri Suazo MD is Attending Physician.                                                sb4 

13:56 Triage completed.                                                                       ap3 

13:57 Arm band placed on right wrist.                                                         ap3 

13:57 Patient maintains SpO2 saturation greater than 95% on room air.                         ap3 

14:21 XRAY Chest (1 view) In Process Unspecified.                                             EDMS

14:26 Josue Fox, RN is Primary Nurse.                                                    bp  

15:10 Initial lab(s) drawn, by me, sent to lab. Inserted saline lock: 20 gauge in right       bp  

      forearm, using aseptic technique. Blood collected. Flushed with 10 mL NS.                   

15:45 Chest For PE Angio CT In Process Unspecified.                                           EDMS

16:56 Kit Naranjo MD is Referral Physician.                                               sb4 

16:57 Elliot Rey MD is Referral Physician.                                              sb4 

17:19 No provider procedures requiring assistance completed. IV discontinued, intact,         ld1 

      bleeding controlled, No redness/swelling at site.                                           

                                                                                                  

Administered Medications:                                                                         

15:30 Drug: Aspirin  mg PO once Route: PO;                                              bp  

15:30 Drug: Nitroglycerin Sublingual 0.4 mg Sublingual once Route: Sublingual;                bp  

16:18 Follow up: Response: No adverse reaction                                                ld1 

16:18 Drug: Famotidine IVP 20 mg IVP once; dilute with 10 mL 0.9% NaCl; give over 2 minutes   ld1 

      Route: IVP; Site: right wrist;                                                              

                                                                                                  

                                                                                                  

Medication:                                                                                       

13:58 VIS not applicable for this client.                                                     ap3 

                                                                                                  

Outcome:                                                                                          

16:57 Discharge ordered by MD.                                                                sb4 

17:19 Discharged to home ambulatory,                                                          ld1 

17:19 Condition: stable                                                                           

17:19 Discharge instructions given to patient, Instructed on discharge instructions, follow       

      up and referral plans. Demonstrated understanding of instructions, follow-up care,          

      medications,                                                                                

17:20 Patient left the ED.                                                                    ld1 

                                                                                                  

Signatures:                                                                                       

Dispatcher [employer]                           EDMS                                                 

Sariah Norman, Jonn Lunsford  mr                                                   

Josue Fox RN                      RN   Kim Avina RN                    RN   ap3                                                  

Alyssia Knight RN                        RN   ld1                                                  

Michelle Jiménez, PA-JULIAN                     PA-C sb4                                                  

                                                                                                  

**************************************************************************************************